# Patient Record
(demographics unavailable — no encounter records)

---

## 2025-05-13 NOTE — HISTORY OF PRESENT ILLNESS
[FreeTextEntry1] : 76 y/o F w/ Hx of obesity, DM2, HTN, obesity, DVT on AC initial evaluation and management of metabolic issues generally feels well and endorses no acute complaints. reports previously well controlled DM2, on metformin only x 5 years, no known micro/macro vascular complications. notes SFU (glipizide 5 mg daily at bedtime) was recently started, weight gain has worsened. januvia was offered but the copay was to high. pt reports compliance w. Metformin 500 mg BID (max tolerated dose). She does not monitor at home.   5/2025: Here for /fu, generally feels well and endorses no acute complaints. No interval events since LV. Today reports compliance w/ alogliptin/Metformin/glipizide as instructed, adhering mostly to a low carb diet, has stopped over consumption of tropical fruits, adherent to low k diet..  She otherwise denies any f/c, CP, SOB, palpitations, tremors, depressed mood, anxiety, palpitations, n/v, stool/urinary abn, skin/weight changes, heat/cold intolerance, HAs, breast/nipple changes, polyuria/polydipsia/nocturia or other complaints.

## 2025-05-13 NOTE — ASSESSMENT
[FreeTextEntry1] : 1) DM2: controlled, A1C target of ~7%. Natural hx of the disease and importance of treatment targets discussed at length, she verbalized understanding. ADA diet and importance of exercise discussed at length. Plan is to cont metformin and introduce DPP4 inh (combination w/ metformin to reduce polypharmacy, alogliptin given high co pay w/ januvia). stop glipizide  with breakfast at this time, titration instructions provided. Refer to Nutritionist today. We natasha check microalbumin, lipids and labs on the NV. 2025 UTD w. vaccines and podiatry/opthalmology referrals. start FSG monitoring BID. again reviewed low k diet, now resolved on latest labs    2) Essential HTN: Pt is at goal BP, restart ACE inh at lowered dose given past hyperkalemia. Reassess microalbumin prior to the NV.    3) Dyslipidemia: Pt is on a moderate intensity statin. Atorvastatin 40 mg QDaily. REassess lipids on the next visit. LDL target <100.   [Carbohydrate Consistent Diet] : carbohydrate consistent diet [Hypoglycemia Management] : hypoglycemia management [Diabetes Foot Care] : diabetes foot care [Long Term Vascular Complications] : long term vascular complications of diabetes [Importance of Diet and Exercise] : importance of diet and exercise to improve glycemic control, achieve weight loss and improve cardiovascular health [Self Monitoring of Blood Glucose] : self monitoring of blood glucose